# Patient Record
Sex: MALE | Race: WHITE | NOT HISPANIC OR LATINO | Employment: UNEMPLOYED | URBAN - METROPOLITAN AREA
[De-identification: names, ages, dates, MRNs, and addresses within clinical notes are randomized per-mention and may not be internally consistent; named-entity substitution may affect disease eponyms.]

---

## 2021-11-11 ENCOUNTER — OFFICE VISIT (OUTPATIENT)
Dept: NEUROSURGERY | Facility: CLINIC | Age: 41
End: 2021-11-11

## 2021-11-11 VITALS — WEIGHT: 180 LBS | HEART RATE: 77 BPM | DIASTOLIC BLOOD PRESSURE: 107 MMHG | SYSTOLIC BLOOD PRESSURE: 141 MMHG

## 2021-11-11 DIAGNOSIS — G89.29 CHRONIC LEFT-SIDED LOW BACK PAIN WITH LEFT-SIDED SCIATICA: ICD-10-CM

## 2021-11-11 DIAGNOSIS — M54.42 CHRONIC LEFT-SIDED LOW BACK PAIN WITH LEFT-SIDED SCIATICA: ICD-10-CM

## 2021-11-11 DIAGNOSIS — M51.27 HERNIATED NUCLEUS PULPOSUS, L5-S1, LEFT: Primary | ICD-10-CM

## 2021-11-11 PROCEDURE — 99204 OFFICE O/P NEW MOD 45 MIN: CPT | Performed by: PHYSICIAN ASSISTANT

## 2021-11-11 RX ORDER — MELOXICAM 15 MG/1
TABLET ORAL
COMMUNITY
Start: 2021-10-18

## 2021-11-11 RX ORDER — METHYLPREDNISOLONE ACETATE 80 MG/ML
INJECTION, SUSPENSION INTRA-ARTICULAR; INTRALESIONAL; INTRAMUSCULAR; SOFT TISSUE
COMMUNITY

## 2021-11-11 RX ORDER — DEXAMETHASONE SODIUM PHOSPHATE 4 MG/ML
INJECTION, SOLUTION INTRA-ARTICULAR; INTRALESIONAL; INTRAMUSCULAR; INTRAVENOUS; SOFT TISSUE
COMMUNITY
End: 2021-11-11

## 2021-11-11 RX ORDER — CYCLOBENZAPRINE HCL 10 MG
TABLET ORAL
COMMUNITY

## 2021-11-11 RX ORDER — OMEPRAZOLE 40 MG/1
CAPSULE, DELAYED RELEASE ORAL
COMMUNITY
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
## 2024-05-02 ENCOUNTER — OFFICE VISIT (OUTPATIENT)
Dept: NEUROSURGERY | Facility: CLINIC | Age: 44
End: 2024-05-02
Payer: COMMERCIAL

## 2024-05-02 ENCOUNTER — HOSPITAL ENCOUNTER (OUTPATIENT)
Dept: OTHER | Facility: HOSPITAL | Age: 44
Discharge: HOME | End: 2024-05-02

## 2024-05-02 VITALS
HEIGHT: 68 IN | SYSTOLIC BLOOD PRESSURE: 112 MMHG | BODY MASS INDEX: 27.13 KG/M2 | DIASTOLIC BLOOD PRESSURE: 78 MMHG | HEART RATE: 86 BPM | WEIGHT: 179 LBS

## 2024-05-02 DIAGNOSIS — M50.30 DDD (DEGENERATIVE DISC DISEASE), CERVICAL: ICD-10-CM

## 2024-05-02 DIAGNOSIS — R20.2 NUMBNESS AND TINGLING IN BOTH HANDS: Primary | ICD-10-CM

## 2024-05-02 DIAGNOSIS — M54.2 CERVICALGIA: ICD-10-CM

## 2024-05-02 DIAGNOSIS — R20.0 NUMBNESS AND TINGLING IN BOTH HANDS: Primary | ICD-10-CM

## 2024-05-02 DIAGNOSIS — M48.02 FORAMINAL STENOSIS OF CERVICAL REGION: ICD-10-CM

## 2024-05-02 DIAGNOSIS — R53.1 SUBJECTIVE WEAKNESS: ICD-10-CM

## 2024-05-02 NOTE — PROGRESS NOTES
"Chief Complaint  Numbness ( 1st, 4th & 5th digits of right hand)    Subjective          Tico Fuchs who is a 43 y.o. year old male who presents to [facility] for Evaluation of the Spine.     The patient complains of pain located in the Cervical Spine.  Patients states the pain has been present for \"forever\".  The pain came on gradually.  The pain scaled level is 0.  The pain  does not radiate .  The pain is Intermittent and described as sharp.  The pain is worse at no particular time of day. Patient states  \"walk it out\" makes the pain better.  Patient states  \"sudden quick movement\" makes the pain worse.    Associated Symptoms Include: Numbness and Tingling in both hands intermittent to the 1st, 4th and 5th digits. He subjectively has weakness in the arms at times.  Conservative Interventions Include: NSAIDs that were ineffective. and Muscle Relaxants that were ineffective.    Was this the result of an injury or accident? : No    History of Previous Spinal Surgery?: No     reports that he has been smoking cigarettes. He has never used smokeless tobacco.    Review of Systems   Musculoskeletal:  Positive for neck pain.   Neurological:  Positive for weakness and numbness.        Objective   Vital Signs:   /78 (BP Location: Left arm, Patient Position: Sitting, Cuff Size: Adult)   Pulse 86   Ht 172.7 cm (68\")   Wt 81.2 kg (179 lb)   BMI 27.22 kg/m²       Physical Exam  Constitutional:       Appearance: Normal appearance.   Pulmonary:      Effort: Pulmonary effort is normal.   Musculoskeletal:         General: Tenderness (bilateral cervical muscles) present.      Cervical back: Normal range of motion.      Comments: Plata's negative bilaterally,  Tinel's testing negative bilaterally   Neurological:      General: No focal deficit present.      Mental Status: He is alert and oriented to person, place, and time.      Sensory: No sensory deficit.      Motor: No " weakness.      Deep Tendon Reflexes: Reflexes normal.   Psychiatric:         Mood and Affect: Mood normal.         Behavior: Behavior normal.        Neurologic Exam     Mental Status   Oriented to person, place, and time.        Result Review      I have personally reviewed the MRI of the cervical spine without contrast from 4/18/2024 which shows multilevel spondylosis without significant central canal narrowing but some left foraminal stenosis at C3-C4 and C4-C5, and right foraminal stenosis at C5-C6 and C6-C7. The foraminal stenosis is mild, likely worse on the right at C5-C6.     Assessment and Plan    Diagnoses and all orders for this visit:    1. Numbness and tingling in both hands (Primary)  -     EMG & Nerve Conduction Test; Future    2. Cervicalgia    3. DDD (degenerative disc disease), cervical    4. Foraminal stenosis of cervical region    5. Subjective weakness  -     EMG & Nerve Conduction Test; Future    He has neck pain, but this is not his main complaint.    He complains of hand numbness/tingling worse on the right in the 1st, 4th and 5th digits in particular.    There is no high-grade stenosis in the cervical spine. There is mild foraminal stenosis at multiple levels, probably worse on the right at C5-C6. I don't think this explain his arm symptoms well.    I do not expect surgery to be likely to help.    He may consider GARETT trial. He reports having this in the past without benefit.    I would consider NCV/EMG testing of the upper extremity to assess for median or ulnar neuropathy, although tinel's testing was negative on examination today and he has no appreciable weakness in the arms.    The patient was counseled on basic recommendations for the reduction and prevention of back, neck, or spine pain in association with spinal disorders, including: cessation/avoidance of nicotine use, maintenance of a healthy BMI and weight, focusing on building/maintaining core strength through core exercise, and  avoidance of activities which worsen the pain. The patient will monitor for changes in symptoms and notify our clinic of these changes as needed.    He will follow-up in 4 weeks to reassess and review NCV/EMG testing results, sooner if needed.    Follow Up   Return in about 4 weeks (around 5/30/2024).  Patient was given instructions and counseling regarding his condition or for health maintenance advice. Please see specific information pulled into the AVS if appropriate.